# Patient Record
Sex: MALE | Race: WHITE | NOT HISPANIC OR LATINO | Employment: FULL TIME | URBAN - METROPOLITAN AREA
[De-identification: names, ages, dates, MRNs, and addresses within clinical notes are randomized per-mention and may not be internally consistent; named-entity substitution may affect disease eponyms.]

---

## 2020-11-09 ENCOUNTER — OFFICE VISIT CONVERTED (OUTPATIENT)
Dept: GASTROENTEROLOGY | Facility: CLINIC | Age: 55
End: 2020-11-09
Attending: NURSE PRACTITIONER

## 2020-11-09 ENCOUNTER — CONVERSION ENCOUNTER (OUTPATIENT)
Dept: GASTROENTEROLOGY | Facility: CLINIC | Age: 55
End: 2020-11-09

## 2021-01-15 ENCOUNTER — HOSPITAL ENCOUNTER (OUTPATIENT)
Dept: PREADMISSION TESTING | Facility: HOSPITAL | Age: 56
Discharge: HOME | End: 2021-01-15
Attending: INTERNAL MEDICINE

## 2021-01-17 LAB — SARS-COV-2 RNA SPEC QL NAA+PROBE: DETECTED

## 2021-01-20 ENCOUNTER — HOSPITAL ENCOUNTER (OUTPATIENT)
Dept: GASTROENTEROLOGY | Facility: HOSPITAL | Age: 56
Setting detail: HOSPITAL OUTPATIENT SURGERY
Discharge: HOME | End: 2021-01-20
Attending: INTERNAL MEDICINE

## 2021-03-08 ENCOUNTER — HOSPITAL ENCOUNTER (OUTPATIENT)
Dept: VACCINE CLINIC | Facility: HOSPITAL | Age: 56
Discharge: HOME | End: 2021-03-08
Attending: INTERNAL MEDICINE

## 2021-03-30 ENCOUNTER — HOSPITAL ENCOUNTER (OUTPATIENT)
Dept: VACCINE CLINIC | Facility: HOSPITAL | Age: 56
Discharge: HOME | End: 2021-03-30
Attending: INTERNAL MEDICINE

## 2021-05-10 NOTE — H&P
History and Physical      Patient Name: Mehrdad Diamond   Patient ID: [id number]   Sex: Male   YOB: 1965    Primary Care Provider: [facility]   Referring Provider: Inocente Bejarano MD    Visit Date: November 9, 2020    Provider: TEQUILA Yanez   Location: [facility]   Location Address: [address] [place] [address], [address]  [place], [place]  [id number]   Location Phone: [phone]          Chief Complaint  · GERD      History Of Present Illness  The patient is a 54 year old /White male who presents on referral from Inocente Bejarano MD for a gastroenterology evaluation.      He presents for evaluation of GERD.      States that GERD has been present for 5-10 years.  Reports that he was prescribed protonix per PCP and felt that symptoms improved. He took for 30 days and then has been taking Pepcid PRN.       Admits burning with swallowing, but denies dysphagia.      Denies family history of GI cancers.      UTD on colon cancer screening.       Past Medical History  Lumbago/low back pain; Sciatica         Past Surgical History  Hernia; Knee surgery         Allergy List  NO KNOWN DRUG ALLERGIES       Allergies Reconciled  Family Medical History  Cancer, Unspecified; Diabetes         Social History  Alcohol (Never); Tobacco (Never)         Review of Systems  · Constitutional  o Denies  o : chills, fever  · Eyes  o Denies  o : blurred vision, changes in vision  · Cardiovascular  o Denies  o : chest pain, irregular heart beats  · Respiratory  o Denies  o : shortness of breath, cough  · Gastrointestinal  o Admits  o : See HPI  · Genitourinary  o Denies  o : dysuria, blood in urine  · Integument  o Denies  o : rash, new skin lesions  · Neurologic  o Denies  o : tingling or numbness, seizures  · Musculoskeletal  o Denies  o : joint pain, joint swelling  · Endocrine  o Denies  o : weight gain, weight loss  · Psychiatric  o Denies  o : anxiety,  "depression      Vitals  Date Time BP Position Site L\R Cuff Size HR RR TEMP (F) WT  HT  BMI kg/m2 BSA m2 O2 Sat FR L/min FiO2 HC       11/09/2020 03:10 /85 Sitting      97.5 176lbs 16oz 5'  11\" 24.69 2.01             Physical Examination  · Constitutional  o Appearance  o : well developed, well-nourished, in no acute distress  · Eyes  o Vision  o :   § Visual Fields  § : eyes move symmetrical in all directions  o Sclerae  o : anicteric  o Pupils and Irises  o : pupils equal and symmetrical  · Neck  o Inspection/Palpation  o : supple  · Respiratory  o Respiratory Effort  o : breathing unlabored  o Inspection of Chest  o : normal appearance, no retractions  o Auscultation of Lungs  o : clear to auscultation bilaterally  · Cardiovascular  o Heart  o :   § Auscultation of Heart  § : no murmurs, gallops or rubs  · Gastrointestinal  o Abdominal Examination  o : soft, nontender to palpation, with normal active bowel sounds, no appreciable hepatosplenomegaly  o Digital Rectal Exam  o : deferred  · Lymphatic  o Neck  o : no palpable lymphadenopathy  · Skin and Subcutaneous Tissue  o General Inspection  o : without focal lesions; turgor is normal  · Psychiatric  o General  o : Alert and oriented x3  o Mood and Affect  o : Mood and affect are appropriate to circumstances  · Extremities  o Extremities  o : No edema, no cyanosis          Assessment  · Pre-op testing     V72.84/Z01.818  · GERD (gastroesophageal reflux disease)     530.81/K21.9      Plan  · Orders  o [facility] Pre-Op Covid-19 Screening (20544) - - 11/09/2020  o Consent for Esophagogastroduodenoscopy (EGD) - Possible risks/complications, benefits, and alternatives to surgical or invasive procedure have been explained to patient and/or legal guardian. - Patient has been evaluated and can tolerate anesthesia and/or sedation. Risks, benefits, and alternatives to anesthesia and sedation have been explained to patient and/or legal guardian. (68725) - - " 11/09/2020  · Medications  o Medications have been Reconciled  · Instructions  o Handouts provided: Pre-procedure instructions including date and time and location of procedure.  o PLAN: Proceed with procedure. Patient understands risks and benefits and is willing to proceed. Understands the risks include, but are not limited to, bleeding and/or perforation.  o Information given on current diagnoses.  o Electronically Identified Patient Education Materials Provided Electronically  · Disposition  o Follow Up after procedure  · Referrals  o ID: [id number] Date: 11/06/2020 Type: Inbound  Specialty: Gastroenterology            Electronically Signed by: Ese Lang APRN -Author on November 9, 2020 04:30:52 PM

## 2021-05-14 VITALS
SYSTOLIC BLOOD PRESSURE: 118 MMHG | WEIGHT: 177 LBS | TEMPERATURE: 97.5 F | HEIGHT: 71 IN | DIASTOLIC BLOOD PRESSURE: 85 MMHG | BODY MASS INDEX: 24.78 KG/M2

## 2021-10-28 ENCOUNTER — FLU SHOT (OUTPATIENT)
Dept: VACCINE CLINIC | Facility: HOSPITAL | Age: 56
End: 2021-10-28

## 2021-10-28 DIAGNOSIS — Z23 NEED FOR INFLUENZA VACCINATION: Primary | ICD-10-CM

## 2022-06-03 ENCOUNTER — HOSPITAL ENCOUNTER (OUTPATIENT)
Dept: CARDIOLOGY | Facility: HOSPITAL | Age: 57
Discharge: HOME | End: 2022-06-03
Admitting: NURSE PRACTITIONER

## 2022-06-03 ENCOUNTER — TRANSCRIBE ORDERS (OUTPATIENT)
Dept: ADMINISTRATIVE | Facility: HOSPITAL | Age: 57
End: 2022-06-03

## 2022-06-03 DIAGNOSIS — Z82.49 FAMILY HISTORY OF ISCHEMIC HEART DISEASE: Primary | ICD-10-CM

## 2022-06-03 DIAGNOSIS — Z82.49 FAMILY HISTORY OF ISCHEMIC HEART DISEASE: ICD-10-CM

## 2022-06-03 LAB — QT INTERVAL: 360 MS

## 2022-06-03 PROCEDURE — 93005 ELECTROCARDIOGRAM TRACING: CPT | Performed by: NURSE PRACTITIONER

## 2022-10-14 ENCOUNTER — OFFICE VISIT (OUTPATIENT)
Dept: CARDIOLOGY | Facility: CLINIC | Age: 57
End: 2022-10-14

## 2022-10-14 VITALS
DIASTOLIC BLOOD PRESSURE: 81 MMHG | SYSTOLIC BLOOD PRESSURE: 123 MMHG | BODY MASS INDEX: 26.88 KG/M2 | HEART RATE: 91 BPM | HEIGHT: 71 IN | WEIGHT: 192 LBS

## 2022-10-14 DIAGNOSIS — I25.84 CORONARY ARTERY CALCIFICATION: ICD-10-CM

## 2022-10-14 DIAGNOSIS — E78.2 MIXED HYPERLIPIDEMIA: Primary | ICD-10-CM

## 2022-10-14 DIAGNOSIS — I25.10 CORONARY ARTERY CALCIFICATION: ICD-10-CM

## 2022-10-14 PROCEDURE — 99204 OFFICE O/P NEW MOD 45 MIN: CPT | Performed by: INTERNAL MEDICINE

## 2022-10-14 RX ORDER — ATORVASTATIN CALCIUM 40 MG/1
TABLET, FILM COATED ORAL
COMMUNITY

## 2022-10-14 RX ORDER — ASPIRIN 81 MG/1
TABLET ORAL
COMMUNITY

## 2022-10-14 NOTE — PROGRESS NOTES
"Chief Complaint  Establish Care and Hyperlipidemia    Subjective        Mehrdad Diamond presents to [facility] CARDIOLOGY  History of present illness:    Patient is a 56-year-old male with past medical history significant for hyperlipidemia and family history of coronary artery disease.  The patient was started on Lipitor 10 mg nightly and had it increased to 40 mg.  He also had a coronary artery calcium score done which was 204.9.  He is active at work but does not have a regular exercise program.  When he is walking around he notes no chest pain.  He denies any palpitations or edema.  He does have a remote short smoking history for quitting 25 to 30 years ago.  He does drink a couple alcoholic beverages a week.  His mother and father have history of coronary artery bypass grafting in their 60s      Past Medical History:   Diagnosis Date   • Hyperlipidemia          History reviewed. No pertinent surgical history.       Social History     Socioeconomic History   • Marital status:    Tobacco Use   • Smoking status: Former     Types: Cigarettes   • Smokeless tobacco: Never   Vaping Use   • Vaping Use: Never used   Substance and Sexual Activity   • Alcohol use: Yes   • Drug use: Never   • Sexual activity: Defer         Family History   Problem Relation Age of Onset   • Heart attack Brother           No Known Allergies         Current Outpatient Medications:   •  aspirin 81 MG EC tablet, aspirin 81 mg tablet,delayed release  Take 1 tablet every day by oral route as directed for 30 days., Disp: , Rfl:   •  atorvastatin (LIPITOR) 40 MG tablet, atorvastatin 40 mg tablet  Take 1 tablet every day by oral route as directed for 30 days., Disp: , Rfl:       ROS:  Cardiac review of systems negative.    Objective     /81   Pulse 91   Ht 180.3 cm (71\")   Wt 87.1 kg (192 lb)   BMI 26.78 kg/m²       General Appearance:   · well developed  · well nourished  HENT:   · oropharynx moist  · lips not " cyanotic  Respiratory:  · no respiratory distress  · normal breath sounds  · no rales  Cardiovascular:  · no jugular venous distention  · regular rhythm  · S1 normal, S2 normal  · no S3, no S4   · no murmur  · no rub, no thrill  · No carotid bruit  · pedal pulses normal  · lower extremity edema: none    Musculoskeletal:  · no clubbing of fingers.   · normocephalic, head atraumatic  Skin:   · warm, dry  Psychiatric:  · judgement and insight appropriate  · normal mood and affect    ECHO:    STRESS:    CATH:  No results found for this or any previous visit.    BMP:   No results found for: GLUCOSE, BUN, CREATININE, NA, K, CL, CO2, CALCIUM, BCR, ANIONGAP, EGFR  LIPIDS:  No results found for: CHOL, TRIG, HDL, LDL, VLDL, LDLHDL      Procedures             ASSESSMENT:  Encounter Diagnoses   Name Primary?   • Mixed hyperlipidemia Yes   • Coronary artery calcification          PLAN:    1.  I am fine with him being on an aspirin due to strong family history.  2.  Continue the Lipitor.  3.  Patient did have an elevated coronary artery calcium score.  I did offer him a stress test but he is completely asymptomatic from a cardiac standpoint.  I also gave him the option of starting a regular exercise program and calling us if he has any exertional cardiac complaints.  He would like to go this route which I think is perfectly fine.  If he starts the regular exercise program he will have his modifiable risk factors under great control.  4.  Patient had a EKG done 6/3/2022 which showed normal sinus rhythm  5.  Patient's blood pressure is under excellent control.          Patient was given instructions and counseling regarding his condition or for health maintenance advice. Please see specific information pulled into the AVS if appropriate.         Milan Harrington MD   10/14/2022  15:37 EDT

## 2025-07-14 ENCOUNTER — HOSPITAL ENCOUNTER (OUTPATIENT)
Facility: HOSPITAL | Age: 60
Setting detail: OBSERVATION
Discharge: HOME | End: 2025-07-15
Attending: EMERGENCY MEDICINE | Admitting: STUDENT IN AN ORGANIZED HEALTH CARE EDUCATION/TRAINING PROGRAM
Payer: OTHER GOVERNMENT

## 2025-07-14 ENCOUNTER — APPOINTMENT (OUTPATIENT)
Dept: CT IMAGING | Facility: HOSPITAL | Age: 60
End: 2025-07-14
Payer: OTHER GOVERNMENT

## 2025-07-14 DIAGNOSIS — R10.30 LOWER ABDOMINAL PAIN: Primary | ICD-10-CM

## 2025-07-14 DIAGNOSIS — M89.9 BONE LESION: ICD-10-CM

## 2025-07-14 DIAGNOSIS — K92.2 LOWER GI BLEEDING: ICD-10-CM

## 2025-07-14 PROBLEM — K52.9 COLITIS: Status: ACTIVE | Noted: 2025-07-14

## 2025-07-14 LAB
027 TOXIN: NORMAL
ABO GROUP BLD: NORMAL
ABO GROUP BLD: NORMAL
ALBUMIN SERPL-MCNC: 4.6 G/DL (ref 3.5–5.2)
ALBUMIN/GLOB SERPL: 1.6 G/DL
ALP SERPL-CCNC: 102 U/L (ref 39–117)
ALT SERPL W P-5'-P-CCNC: 30 U/L (ref 1–41)
ANION GAP SERPL CALCULATED.3IONS-SCNC: 11.7 MMOL/L (ref 5–15)
APTT PPP: 24.8 SECONDS (ref 78–95.9)
AST SERPL-CCNC: 25 U/L (ref 1–40)
BASOPHILS # BLD AUTO: 0.05 10*3/MM3 (ref 0–0.2)
BASOPHILS NFR BLD AUTO: 0.4 % (ref 0–1.5)
BILIRUB SERPL-MCNC: 1.2 MG/DL (ref 0–1.2)
BILIRUB UR QL STRIP: NEGATIVE
BLD GP AB SCN SERPL QL: NEGATIVE
BUN SERPL-MCNC: 11.7 MG/DL (ref 6–20)
BUN/CREAT SERPL: 12.9 (ref 7–25)
C DIFF TOX GENS STL QL NAA+PROBE: NEGATIVE
CALCIUM SPEC-SCNC: 9.7 MG/DL (ref 8.6–10.5)
CHLORIDE SERPL-SCNC: 103 MMOL/L (ref 98–107)
CLARITY UR: CLEAR
CO2 SERPL-SCNC: 24.3 MMOL/L (ref 22–29)
COLOR UR: YELLOW
CREAT SERPL-MCNC: 0.91 MG/DL (ref 0.76–1.27)
D-LACTATE SERPL-SCNC: 1.2 MMOL/L (ref 0.5–2)
DEPRECATED RDW RBC AUTO: 39.8 FL (ref 37–54)
EGFRCR SERPLBLD CKD-EPI 2021: 97.7 ML/MIN/1.73
EOSINOPHIL # BLD AUTO: 0.1 10*3/MM3 (ref 0–0.4)
EOSINOPHIL NFR BLD AUTO: 0.9 % (ref 0.3–6.2)
ERYTHROCYTE [DISTWIDTH] IN BLOOD BY AUTOMATED COUNT: 11.5 % (ref 12.3–15.4)
ETHANOL BLD-MCNC: <10 MG/DL (ref 0–10)
ETHANOL UR QL: <0.01 %
GLOBULIN UR ELPH-MCNC: 2.9 GM/DL
GLUCOSE SERPL-MCNC: 110 MG/DL (ref 65–99)
GLUCOSE UR STRIP-MCNC: NEGATIVE MG/DL
HCT VFR BLD AUTO: 48.3 % (ref 37.5–51)
HEMOCCULT STL QL IA: POSITIVE
HGB BLD-MCNC: 17 G/DL (ref 13–17.7)
HGB UR QL STRIP.AUTO: NEGATIVE
HOLD SPECIMEN: NORMAL
HOLD SPECIMEN: NORMAL
IMM GRANULOCYTES # BLD AUTO: 0.04 10*3/MM3 (ref 0–0.05)
IMM GRANULOCYTES NFR BLD AUTO: 0.3 % (ref 0–0.5)
INR PPP: 0.99 (ref 0.86–1.15)
KETONES UR QL STRIP: NEGATIVE
LEUKOCYTE ESTERASE UR QL STRIP.AUTO: NEGATIVE
LIPASE SERPL-CCNC: 49 U/L (ref 13–60)
LYMPHOCYTES # BLD AUTO: 1.7 10*3/MM3 (ref 0.7–3.1)
LYMPHOCYTES NFR BLD AUTO: 14.8 % (ref 19.6–45.3)
MCH RBC QN AUTO: 33.5 PG (ref 26.6–33)
MCHC RBC AUTO-ENTMCNC: 35.2 G/DL (ref 31.5–35.7)
MCV RBC AUTO: 95.1 FL (ref 79–97)
MONOCYTES # BLD AUTO: 1.05 10*3/MM3 (ref 0.1–0.9)
MONOCYTES NFR BLD AUTO: 9.1 % (ref 5–12)
NEUTROPHILS NFR BLD AUTO: 74.5 % (ref 42.7–76)
NEUTROPHILS NFR BLD AUTO: 8.56 10*3/MM3 (ref 1.7–7)
NITRITE UR QL STRIP: NEGATIVE
NRBC BLD AUTO-RTO: 0 /100 WBC (ref 0–0.2)
PH UR STRIP.AUTO: 7.5 [PH] (ref 5–8)
PLATELET # BLD AUTO: 234 10*3/MM3 (ref 140–450)
PMV BLD AUTO: 9.7 FL (ref 6–12)
POTASSIUM SERPL-SCNC: 4.1 MMOL/L (ref 3.5–5.2)
PROT SERPL-MCNC: 7.5 G/DL (ref 6–8.5)
PROT UR QL STRIP: NEGATIVE
PROTHROMBIN TIME: 13.5 SECONDS (ref 11.8–14.9)
RBC # BLD AUTO: 5.08 10*6/MM3 (ref 4.14–5.8)
RH BLD: POSITIVE
RH BLD: POSITIVE
SODIUM SERPL-SCNC: 139 MMOL/L (ref 136–145)
SP GR UR STRIP: >1.03 (ref 1–1.03)
T&S EXPIRATION DATE: NORMAL
UROBILINOGEN UR QL STRIP: ABNORMAL
WBC NRBC COR # BLD AUTO: 11.5 10*3/MM3 (ref 3.4–10.8)
WHOLE BLOOD HOLD COAG: NORMAL
WHOLE BLOOD HOLD SPECIMEN: NORMAL

## 2025-07-14 PROCEDURE — 99252 IP/OBS CONSLTJ NEW/EST SF 35: CPT

## 2025-07-14 PROCEDURE — 86901 BLOOD TYPING SEROLOGIC RH(D): CPT | Performed by: EMERGENCY MEDICINE

## 2025-07-14 PROCEDURE — 80053 COMPREHEN METABOLIC PANEL: CPT | Performed by: EMERGENCY MEDICINE

## 2025-07-14 PROCEDURE — 82274 ASSAY TEST FOR BLOOD FECAL: CPT | Performed by: EMERGENCY MEDICINE

## 2025-07-14 PROCEDURE — 87493 C DIFF AMPLIFIED PROBE: CPT | Performed by: NURSE PRACTITIONER

## 2025-07-14 PROCEDURE — 74177 CT ABD & PELVIS W/CONTRAST: CPT

## 2025-07-14 PROCEDURE — 86900 BLOOD TYPING SEROLOGIC ABO: CPT

## 2025-07-14 PROCEDURE — 99285 EMERGENCY DEPT VISIT HI MDM: CPT

## 2025-07-14 PROCEDURE — 86900 BLOOD TYPING SEROLOGIC ABO: CPT | Performed by: EMERGENCY MEDICINE

## 2025-07-14 PROCEDURE — 83605 ASSAY OF LACTIC ACID: CPT | Performed by: EMERGENCY MEDICINE

## 2025-07-14 PROCEDURE — 96375 TX/PRO/DX INJ NEW DRUG ADDON: CPT

## 2025-07-14 PROCEDURE — 86850 RBC ANTIBODY SCREEN: CPT | Performed by: EMERGENCY MEDICINE

## 2025-07-14 PROCEDURE — 25010000002 MORPHINE PER 10 MG: Performed by: NURSE PRACTITIONER

## 2025-07-14 PROCEDURE — 25010000002 ONDANSETRON PER 1 MG: Performed by: NURSE PRACTITIONER

## 2025-07-14 PROCEDURE — 36415 COLL VENOUS BLD VENIPUNCTURE: CPT

## 2025-07-14 PROCEDURE — 25510000001 IOPAMIDOL PER 1 ML: Performed by: EMERGENCY MEDICINE

## 2025-07-14 PROCEDURE — G0378 HOSPITAL OBSERVATION PER HR: HCPCS

## 2025-07-14 PROCEDURE — 86901 BLOOD TYPING SEROLOGIC RH(D): CPT

## 2025-07-14 PROCEDURE — 87505 NFCT AGENT DETECTION GI: CPT | Performed by: NURSE PRACTITIONER

## 2025-07-14 PROCEDURE — 99222 1ST HOSP IP/OBS MODERATE 55: CPT | Performed by: STUDENT IN AN ORGANIZED HEALTH CARE EDUCATION/TRAINING PROGRAM

## 2025-07-14 PROCEDURE — 81003 URINALYSIS AUTO W/O SCOPE: CPT | Performed by: NURSE PRACTITIONER

## 2025-07-14 PROCEDURE — 85730 THROMBOPLASTIN TIME PARTIAL: CPT | Performed by: NURSE PRACTITIONER

## 2025-07-14 PROCEDURE — 82077 ASSAY SPEC XCP UR&BREATH IA: CPT | Performed by: NURSE PRACTITIONER

## 2025-07-14 PROCEDURE — 85025 COMPLETE CBC W/AUTO DIFF WBC: CPT | Performed by: EMERGENCY MEDICINE

## 2025-07-14 PROCEDURE — 85610 PROTHROMBIN TIME: CPT | Performed by: NURSE PRACTITIONER

## 2025-07-14 PROCEDURE — 83690 ASSAY OF LIPASE: CPT | Performed by: NURSE PRACTITIONER

## 2025-07-14 RX ORDER — BISACODYL 10 MG
10 SUPPOSITORY, RECTAL RECTAL DAILY PRN
Status: DISCONTINUED | OUTPATIENT
Start: 2025-07-14 | End: 2025-07-15 | Stop reason: HOSPADM

## 2025-07-14 RX ORDER — ATORVASTATIN CALCIUM 40 MG/1
40 TABLET, FILM COATED ORAL NIGHTLY
Status: DISCONTINUED | OUTPATIENT
Start: 2025-07-14 | End: 2025-07-15 | Stop reason: HOSPADM

## 2025-07-14 RX ORDER — SODIUM CHLORIDE 9 MG/ML
40 INJECTION, SOLUTION INTRAVENOUS AS NEEDED
Status: DISCONTINUED | OUTPATIENT
Start: 2025-07-14 | End: 2025-07-15 | Stop reason: HOSPADM

## 2025-07-14 RX ORDER — ACETAMINOPHEN 325 MG/1
650 TABLET ORAL EVERY 6 HOURS PRN
Status: DISCONTINUED | OUTPATIENT
Start: 2025-07-14 | End: 2025-07-15

## 2025-07-14 RX ORDER — SODIUM CHLORIDE 0.9 % (FLUSH) 0.9 %
10 SYRINGE (ML) INJECTION AS NEEDED
Status: DISCONTINUED | OUTPATIENT
Start: 2025-07-14 | End: 2025-07-15 | Stop reason: HOSPADM

## 2025-07-14 RX ORDER — SODIUM CHLORIDE 0.9 % (FLUSH) 0.9 %
10 SYRINGE (ML) INJECTION EVERY 12 HOURS SCHEDULED
Status: DISCONTINUED | OUTPATIENT
Start: 2025-07-14 | End: 2025-07-15 | Stop reason: HOSPADM

## 2025-07-14 RX ORDER — AMOXICILLIN 250 MG
2 CAPSULE ORAL 2 TIMES DAILY PRN
Status: DISCONTINUED | OUTPATIENT
Start: 2025-07-14 | End: 2025-07-15 | Stop reason: HOSPADM

## 2025-07-14 RX ORDER — ASPIRIN 81 MG/1
81 TABLET ORAL DAILY
Status: DISCONTINUED | OUTPATIENT
Start: 2025-07-14 | End: 2025-07-15 | Stop reason: HOSPADM

## 2025-07-14 RX ORDER — POLYETHYLENE GLYCOL 3350 17 G/17G
17 POWDER, FOR SOLUTION ORAL DAILY PRN
Status: DISCONTINUED | OUTPATIENT
Start: 2025-07-14 | End: 2025-07-15 | Stop reason: HOSPADM

## 2025-07-14 RX ORDER — IOPAMIDOL 755 MG/ML
100 INJECTION, SOLUTION INTRAVASCULAR
Status: COMPLETED | OUTPATIENT
Start: 2025-07-14 | End: 2025-07-14

## 2025-07-14 RX ORDER — BISACODYL 5 MG/1
5 TABLET, DELAYED RELEASE ORAL DAILY PRN
Status: DISCONTINUED | OUTPATIENT
Start: 2025-07-14 | End: 2025-07-15 | Stop reason: HOSPADM

## 2025-07-14 RX ORDER — ONDANSETRON 2 MG/ML
4 INJECTION INTRAMUSCULAR; INTRAVENOUS ONCE
Status: COMPLETED | OUTPATIENT
Start: 2025-07-14 | End: 2025-07-14

## 2025-07-14 RX ADMIN — IOPAMIDOL 100 ML: 755 INJECTION, SOLUTION INTRAVENOUS at 09:27

## 2025-07-14 RX ADMIN — Medication 10 ML: at 15:05

## 2025-07-14 RX ADMIN — ACETAMINOPHEN 650 MG: 325 TABLET ORAL at 15:00

## 2025-07-14 RX ADMIN — MORPHINE SULFATE 4 MG: 4 INJECTION, SOLUTION INTRAMUSCULAR; INTRAVENOUS at 10:28

## 2025-07-14 RX ADMIN — ATORVASTATIN CALCIUM 40 MG: 40 TABLET, FILM COATED ORAL at 20:05

## 2025-07-14 RX ADMIN — Medication 10 ML: at 20:06

## 2025-07-14 RX ADMIN — ONDANSETRON 4 MG: 2 INJECTION, SOLUTION INTRAMUSCULAR; INTRAVENOUS at 10:28

## 2025-07-14 NOTE — PLAN OF CARE
Goal Outcome Evaluation:  Plan of Care Reviewed With: patient        Progress: no change  Outcome Evaluation: Patient a new admission this shift, vitals stable, refused a full skin assessment, having bright red bloody stools.

## 2025-07-14 NOTE — H&P
[facility] HISTORY AND PHYSICAL  Date: 2025   Patient Name: Mehrdad Diamond  : 1965  MRN: [id number]  Primary Care Physician:  Ashtyn Coyle APRN  Date of admission: 2025    Subjective   Subjective     Chief Complaint: Bright red blood per rectum    HPI:    Mehrdad Diamond is a 59 y.o. male with history of mixed hyperlipidemia and family history of CAD who presented with bright red blood per rectum.  Patient stated symptoms started Saturday night.  He takes aspirin at home.  He noticed some blood initially started in the night but after that started having leland blood.  No history of hemorrhoids and painless BRBPR.  He also started having abdominal pain along with leland blood, denied any diarrhea.  Felt like he was having a bowel movement but stated no stool came out, it was only leland blood.  It was happening often and also had 1 episode of vomiting yesterday along with abdominal pain after which she got concerned and presented to ER for further evaluation management.  Takes aspirin daily but did not take his last night dose, does take ibuprofen occasionally for headache.  He had last colonoscopy done in 2017 which showed 1 benign polyp.  On presentation to ER, vitals were stable.  Lab work showed WBC of 11.50 and hemoglobin of 17.0.  FOBT was positive.  PT 13.5 and INR 0.99.  Lactic acid of 1.2 and lipase was normal.  CT abdomen pelvis with contrast showed long segment of concentric wall thickening with pericolonic fat stranding along the descending colon compatible with colitis and a large prostate.  Also showed nonspecific subcortical area of sclerosis of right femoral neck which could represent sclerotic bone lesion.  GI was consulted by ER physician and patient was admitted under observation for further evaluation management.      Personal History     Past Medical History:  Past Medical History:   Diagnosis Date    Cancer     Basal Cell removed from nose     Hyperlipidemia        Past Surgical History:  Past Surgical History:   Procedure Laterality Date    COLONOSCOPY  02/01/2017    HERNIA REPAIR         Family History:       Social History:   Social History     Socioeconomic History    Marital status:    Tobacco Use    Smoking status: Former     Average packs/day: 0.5 packs/day for 10.0 years (5.0 ttl pk-yrs)     Types: Cigarettes     Start date: 1990     Passive exposure: Past    Smokeless tobacco: Never   Vaping Use    Vaping status: Never Used   Substance and Sexual Activity    Alcohol use: Yes     Alcohol/week: 14.0 standard drinks of alcohol     Types: 14 Drinks containing 0.5 oz of alcohol per week     Comment: Couple glasses of bourbon every day    Drug use: Never    Sexual activity: Yes     Partners: Female       Home Medications:  Evolocumab, aspirin, and atorvastatin    Allergies:  No Known Allergies    Review of Systems   All systems were reviewed and negative except for: Bright red blood per rectum, abdominal pain    Objective   Objective     Vitals:   Temp:  [97.3 °F (36.3 °C)-97.8 °F (36.6 °C)] 97.3 °F (36.3 °C)  Heart Rate:  [69-81] 73  Resp:  [16] 16  BP: (116-146)/(62-97) 135/62    Physical Exam    Constitutional: Awake, alert, no acute distress   Respiratory: Clear to auscultation bilaterally, nonlabored respirations    Cardiovascular: RRR, no murmurs, rubs, or gallops, palpable pedal pulses bilaterally   Gastrointestinal: Positive bowel sounds, soft, nontender, non distended   Neurologic: Alert, awake, oriented x 3,  speech clear       Result Review    Result Review:  I have personally reviewed the results from the time of this admission to 7/14/2025 18:01 EDT and agree with these findings:  []  Laboratory  []  Microbiology  []  Radiology  []  EKG/Telemetry   []  Cardiology/Vascular   []  Pathology  []  Old records  []  Other:      Assessment & Plan   Assessment / Plan     Assessment/Plan:   Bright red blood per rectum  Abdominal  pain  Vomiting  Hyperlipidemia  Family history of CAD    -Patient is being admitted to the hospital service.  - Presented with bright red blood per rectum along with abdominal pain and 1 episode of vomiting.  - Last colonoscopy done in 2017 showed 1 benign polyp.  - Hemoglobin 17.0 on presentation.  - FOBT positive.  - GI consulted, recommended obtaining C. difficile panel and stool cultures.  - Planning on outpatient colonoscopy in 6 weeks if stable inpatient.  - Appreciate further input by GI.  - Awaiting enteric bacterial panel along with C. difficile.  - Will hold home aspirin.  - Continue atorvastatin.  -PSA level ordered.  - Labs in AM.  - Continue rest of current management.    VTE Prophylaxis:  No VTE prophylaxis order currently exists.        CODE STATUS:    Code Status (Patient has no pulse and is not breathing): CPR (Attempt to Resuscitate)  Medical Interventions (Patient has pulse or is breathing): Full Support      Admission Status:  I believe this patient meets observation status.    Electronically signed by Meredith Lomeli MD, 07/14/25, 12:04 PM EDT.

## 2025-07-14 NOTE — ED PROVIDER NOTES
"SHARED VISIT ATTESTATION:    This visit was performed by myself and an APC.  I personally approved the management plan/medical decision making and take responsibility for the patient management.      SHARED VISIT NOTE:    Patient is 59 y.o. year old male that presents to the ED for evaluation of abdominal pain and rectal bleeding.  Patient does drink daily and is on aspirin.  Bright red blood per rectum..     Physical Exam    ED Course:    /90 (BP Location: Left arm, Patient Position: Lying)   Pulse 69   Temp 97.7 °F (36.5 °C) (Oral)   Resp 16   Ht 180.3 cm (71\")   Wt 88.7 kg (195 lb 8.8 oz)   SpO2 93%   BMI 27.27 kg/m²       The following orders were placed and all results were independently analyzed by me:  Orders Placed This Encounter   Procedures    Enteric Bacterial Panel - Stool, Per Rectum    Clostridioides difficile Toxin - Stool, Per Rectum    Clostridioides difficile Toxin, PCR - Stool, Per Rectum    CT Abdomen Pelvis With Contrast    [place] Draw    Comprehensive Metabolic Panel    Lactic Acid, Plasma    Occult Blood, Fecal By Immunoassay - Stool, Per Rectum    CBC Auto Differential    Ethanol    Lipase    Urinalysis With Microscopic If Indicated (No Culture) - Urine, Clean Catch    Protime-INR    aPTT    PSA Total, Reflex To Free    Basic Metabolic Panel    Magnesium    Phosphorus    Calcium    Diet: Cardiac, Gastrointestinal; Healthy Heart (2-3 Na+); Low Irritant; Fluid Consistency: Thin (IDDSI 0)    Undress & Gown    Measure Blood Pressure    Vital Signs    Orthostatic Blood Pressure    Vital Signs    Intake & Output    Weigh Patient    Oral Care    Saline Lock & Maintain IV Access    Discontinue Cardiac Monitoring    Inpatient Hospitalist Consult    IP Consult to Gastroenterology    Patient Isolation Contact Spore    Pulse Oximetry    Oxygen Therapy- Nasal Cannula; Titrate 1-6 LPM Per SpO2; 90 - 95%    Type & Screen    ABO RH Specimen Verification    Insert Peripheral IV    Insert " Peripheral IV    Initiate Observation Status    CBC & Differential    Green Top (Gel)    Lavender Top    Gold Top - SST    Light Blue Top    CBC & Differential       Medications Given in the Emergency Department:  Medications   sodium chloride 0.9 % flush 10 mL (has no administration in time range)   sodium chloride 0.9 % flush 10 mL (has no administration in time range)   sodium chloride 0.9 % flush 10 mL (has no administration in time range)   sodium chloride 0.9 % infusion 40 mL (has no administration in time range)   sennosides-docusate (PERICOLACE) 8.6-50 MG per tablet 2 tablet (has no administration in time range)     And   polyethylene glycol (MIRALAX) packet 17 g (has no administration in time range)     And   bisacodyl (DULCOLAX) EC tablet 5 mg (has no administration in time range)     And   bisacodyl (DULCOLAX) suppository 10 mg (has no administration in time range)   aspirin EC tablet 81 mg ( Oral Dose Auto Held 7/22/25 0900)   atorvastatin (LIPITOR) tablet 40 mg (has no administration in time range)   acetaminophen (TYLENOL) tablet 650 mg (has no administration in time range)   morphine injection 4 mg (4 mg Intravenous Given 7/14/25 1028)   ondansetron (ZOFRAN) injection 4 mg (4 mg Intravenous Given 7/14/25 1028)   iopamidol (ISOVUE-370) 76 % injection 100 mL (100 mL Intravenous Given 7/14/25 0927)        ED Course:         Labs:    Lab Results (last 24 hours)       Procedure Component Value Units Date/Time    CBC & Differential [[id number]]  (Abnormal) Collected: 07/14/25 0855    Specimen: Blood Updated: 07/14/25 0910    Narrative:      The following orders were created for panel order CBC & Differential.  Procedure                               Abnormality         Status                     ---------                               -----------         ------                     CBC Auto Differential[[id number]]        Abnormal            Final result                 Please view results for these tests on  the individual orders.    Comprehensive Metabolic Panel [[id number]]  (Abnormal) Collected: 07/14/25 0855    Specimen: Blood Updated: 07/14/25 0925     Glucose 110 mg/dL      BUN 11.7 mg/dL      Creatinine 0.91 mg/dL      Sodium 139 mmol/L      Potassium 4.1 mmol/L      Chloride 103 mmol/L      CO2 24.3 mmol/L      Calcium 9.7 mg/dL      Total Protein 7.5 g/dL      Albumin 4.6 g/dL      ALT (SGPT) 30 U/L      AST (SGOT) 25 U/L      Alkaline Phosphatase 102 U/L      Total Bilirubin 1.2 mg/dL      Globulin 2.9 gm/dL      A/G Ratio 1.6 g/dL      BUN/Creatinine Ratio 12.9     Anion Gap 11.7 mmol/L      eGFR 97.7 mL/min/1.73     Narrative:      GFR Categories in Chronic Kidney Disease (CKD)              GFR Category          GFR (mL/min/1.73)    Interpretation  G1                    90 or greater        Normal or high (1)  G2                    60-89                Mild decrease (1)  G3a                   45-59                Mild to moderate decrease  G3b                   30-44                Moderate to severe decrease  G4                    15-29                Severe decrease  G5                    14 or less           Kidney failure    (1)In the absence of evidence of kidney disease, neither GFR category G1 or G2 fulfill the criteria for CKD.    eGFR calculation 2021 CKD-EPI creatinine equation, which does not include race as a factor    Lactic Acid, Plasma [[id number]]  (Normal) Collected: 07/14/25 0855    Specimen: Blood Updated: 07/14/25 0921     Lactate 1.2 mmol/L     CBC Auto Differential [[id number]]  (Abnormal) Collected: 07/14/25 0855    Specimen: Blood Updated: 07/14/25 0910     WBC 11.50 10*3/mm3      RBC 5.08 10*6/mm3      Hemoglobin 17.0 g/dL      Hematocrit 48.3 %      MCV 95.1 fL      MCH 33.5 pg      MCHC 35.2 g/dL      RDW 11.5 %      RDW-SD 39.8 fl      MPV 9.7 fL      Platelets 234 10*3/mm3      Neutrophil % 74.5 %      Lymphocyte % 14.8 %      Monocyte % 9.1 %      Eosinophil % 0.9 %      Basophil %  0.4 %      Immature Grans % 0.3 %      Neutrophils, Absolute 8.56 10*3/mm3      Lymphocytes, Absolute 1.70 10*3/mm3      Monocytes, Absolute 1.05 10*3/mm3      Eosinophils, Absolute 0.10 10*3/mm3      Basophils, Absolute 0.05 10*3/mm3      Immature Grans, Absolute 0.04 10*3/mm3      nRBC 0.0 /100 WBC     Ethanol [[id number]] Collected: 07/14/25 0855    Specimen: Blood Updated: 07/14/25 0933     Ethanol <10 mg/dL      Ethanol % <0.010 %     Narrative:      Not for legal purposes.    Lipase [[id number]]  (Normal) Collected: 07/14/25 0855    Specimen: Blood Updated: 07/14/25 0933     Lipase 49 U/L     Protime-INR [[id number]]  (Normal) Collected: 07/14/25 0855    Specimen: Blood Updated: 07/14/25 0921     Protime 13.5 Seconds      INR 0.99    Narrative:      Suggested Therapeutic Ranges For Oral Anticoagulant Therapy:  Level of Therapy                      INR Target Range  Standard Dose                            2.0-3.0  High Dose                                2.5-3.5  Patients not receiving anticoagulant  Therapy Normal Range                     0.86-1.15    aPTT [[id number]]  (Abnormal) Collected: 07/14/25 0855    Specimen: Blood Updated: 07/14/25 0921     PTT 24.8 seconds     Occult Blood, Fecal By Immunoassay - Stool, Per Rectum [[id number]]  (Abnormal) Collected: 07/14/25 1002    Specimen: Stool from Per Rectum Updated: 07/14/25 1043     Occult Blood, Fecal by Immunoassay Positive    Urinalysis With Microscopic If Indicated (No Culture) - Urine, Clean Catch [[id number]]  (Abnormal) Collected: 07/14/25 1003    Specimen: Urine, Clean Catch Updated: 07/14/25 1018     Color, UA Yellow     Appearance, UA Clear     pH, UA 7.5     Specific Gravity, UA >1.030     Glucose, UA Negative     Ketones, UA Negative     Bilirubin, UA Negative     Blood, UA Negative     Protein, UA Negative     Leuk Esterase, UA Negative     Nitrite, UA Negative     Urobilinogen, UA 0.2 E.U./dL    Narrative:      Urine microscopic not indicated.              Imaging:    CT Abdomen Pelvis With Contrast  Result Date: 7/14/2025  CT ABDOMEN PELVIS W CONTRAST Date of Exam: 7/14/2025 9:17 AM EDT Indication: diffuse abdominal pain, rectal bleeding. Comparison: None available. Technique: Axial CT images were obtained of the abdomen and pelvis after the uneventful intravenous administration of iodinated contrast. Reconstructed coronal and sagittal images were also obtained. Automated exposure control and iterative construction methods were used. Findings: Lung Bases:  Mild bibasilar discoid atelectasis or scarring Liver: Mild diffuse decreased liver density compatible with fatty replacement. No focal liver lesions are seen Biliary/Gallbladder: The gallbladder is without evidence of radiopaque stones. The biliary tree is nondilated. Spleen:Spleen is normal in size and CT density. Pancreas: Pancreas shows homogeneous density. There is no evidence of pancreatic mass or peripancreatic fluid. Kidneys: Kidneys are normal in size. There are no stones or hydronephrosis. Adrenals: Adrenal glands are unremarkable. Retroperitoneal/Lymph Nodes/Vasculature: No retroperitoneal adenopathy is identified by size criteria. Gastrointestinal/Mesentery: The small bowel loops are normal in caliber. Terminal ileum within normal limits. Appendix is normal. The large bowel is not dilated. There is a long segment of concentric wall thickening with pericolonic fat stranding along the descending colon compatible with a colitis. There is no free fluid or free air Bladder: The bladder is unremarkable Diffusely enlarged prostate results in mass effect over the bladder. Omental fat inguinal hernias Bony Structures: Multilevel disc bulges with lumbar spondylosis. Extensive endplate sclerosis at L4-5. No acute fractures. Focal nonspecific subcortical area of sclerosis at the right femoral neck could represent a sclerotic bone lesion (image 199 series  3, 103 series 4     Impression: 1.Long segment of  concentric wall thickening with pericolonic fat stranding along the descending colon compatible with a colitis. Follow-up to resolution recommended 2.Enlarged prostate. Clinical and PSA correlation recommended 3.Nonspecific subcortical area of sclerosis at the right femoral neck could represent a sclerotic bone lesion. Correlate clinically and may consider further evaluation with a nonemergent MRI of the hip Electronically Signed: Sterling Brian MD  7/14/2025 9:41 AM EDT  Workstation ID: [id number]      MDM:    Patient with bright red blood per rectum.  Appears to have acute colitis.  Will admit to the hospital further workup and management.    Procedures    Labs were collected in the emergency department and all labs were reviewed and interpreted by me.  CT scan was performed in the emergency department and the CT scan radiology impression was interpreted by me.                     Jesus Meza MD  14:56 EDT  07/14/25         Jesus Meza MD  07/14/25 [id number]

## 2025-07-14 NOTE — CONSULTS
[facility]  Initial Inpatient Consult Note    Referring Provider: ED    Reason for Consultation: Colitis    Subjective     History of present illness:    59 y.o. male with a past medical history of hyperlipidemia presented to the ED for evaluation of abdominal pain and rectal bleeding.  Patient states yesterday morning he started having abdominal cramping along the lower abdomen along with diarrhea.  Patient states it was about 1 ounce of blood with each bowel movement.  Patient also states that he was having diarrhea every 15-20 min, but that has slowed down and last night it was every 1-2 hours.  Sometimes when he goes it is straight bright red blood.  He had one episode of vomiting yesterday, but hasn't occurred again.  He does take Ibuprofen occasionally for headache, but not daily.  Patient denies nausea, hematemesis, melena, and fevers.    Patient's last colonoscopy was performed in 2017-report not found-patient reports 1 benign polyp   Patient had colo guard recently which was normal per patient     07/14/2025 CT Abdomen/Pelvis  1.Long segment of concentric wall thickening with pericolonic fat stranding along the descending colon compatible with a colitis. Follow-up to resolution recommended  2.Enlarged prostate. Clinical and PSA correlation recommended  3.Nonspecific subcortical area of sclerosis at the right femoral neck could represent a sclerotic bone lesion. Correlate clinically and may consider further evaluation with a nonemergent MRI of the hip     Past Medical History:  Past Medical History:   Diagnosis Date    Hyperlipidemia      Past Surgical History:  Past Surgical History:   Procedure Laterality Date    COLONOSCOPY  02/01/2017      Social History:   Social History     Tobacco Use    Smoking status: Former     Types: Cigarettes    Smokeless tobacco: Never   Substance Use Topics    Alcohol use: Yes      Family History:  Family History   Problem Relation Age of Onset     Heart attack Brother        Home Meds:  (Not in a hospital admission)    Current Meds:      Allergies:  No Known Allergies  Review of Systems  Pertinent items are noted in HPI     Objective     Vital Signs  Temp:  [97.8 °F (36.6 °C)] 97.8 °F (36.6 °C)  Heart Rate:  [73-81] 79  Resp:  [16] 16  BP: (140-146)/(96-97) 146/96  Physical Exam:  General Appearance:    Alert, cooperative, in no acute distress   Head:    Normocephalic, without obvious abnormality, atraumatic   Eyes:          conjunctivae and sclerae normal, no icterus   Throat:   no thrush, oral mucosa moist   Neck:   Supple, no adenopathy   Lungs:     Unlabored breathing     Heart:    Regular rhythm and normal rate    Chest Wall:    No abnormalities observed   Abdomen:     Soft, non distended, tender in LQ's   Extremities:   no edema, no redness   Skin:   No bruising or rash   Psychiatric:  normal mood and insight     Results Review:   I reviewed the patient's new clinical results.    Results from last 7 days   Lab Units 07/14/25  0855   WBC 10*3/mm3 11.50*   HEMOGLOBIN g/dL 17.0   HEMATOCRIT % 48.3   PLATELETS 10*3/mm3 234     Results from last 7 days   Lab Units 07/14/25  0855   SODIUM mmol/L 139   POTASSIUM mmol/L 4.1   CHLORIDE mmol/L 103   CO2 mmol/L 24.3   BUN mg/dL 11.7   CREATININE mg/dL 0.91   CALCIUM mg/dL 9.7   BILIRUBIN mg/dL 1.2   ALK PHOS U/L 102   ALT (SGPT) U/L 30   AST (SGOT) U/L 25   GLUCOSE mg/dL 110*     Results from last 7 days   Lab Units 07/14/25  0855   INR  0.99     Lab Results   Lab Value Date/Time    LIPASE 49 07/14/2025 0855       Radiology:  CT Abdomen Pelvis With Contrast  Result Date: 7/14/2025  Impression: 1.Long segment of concentric wall thickening with pericolonic fat stranding along the descending colon compatible with a colitis. Follow-up to resolution recommended 2.Enlarged prostate. Clinical and PSA correlation recommended 3.Nonspecific subcortical area of sclerosis at the right femoral neck could represent a sclerotic  bone lesion. Correlate clinically and may consider further evaluation with a nonemergent MRI of the hip Electronically Signed: Sterling Brian MD  7/14/2025 9:41 AM EDT  Workstation ID: [id number]       Assessment & Plan     * No active hospital problems. *       Assessment:  Colitis  Abdominal pain  Bright red blood per rectum    Plan:  Discussed findings of Colitis with patient and wife-will hold on antibiotics for now-Dr. Maradiaga ordered C-diff panel and stool cultures-will await results  Patient will need outpatient colonoscopy in 6 weeks   Patient understands and agrees to the plan      I discussed the patients findings and my recommendations with patient and family.    Electronically signed by TEQUILA Payan, 07/14/25, 10:44 AM EDT.      Patient seen and data reviewed.  Patient stool C. difficile is negative.  Cultures are still pending.  Patient symptomatically has improved significantly had a semiformed brown stool today.  He has no abdominal pain.  But he also needs him is sick.  Patient's last colonoscopy was about 8 to 10 years ago patient did have a polyp.  Discussed the importance of colonoscopy and patient will schedule that as an outpatient.

## 2025-07-14 NOTE — ED PROVIDER NOTES
rodger: 8:50 AM EDT  Date of encounter:  7/14/2025  Independent Historian/Clinical History and Information was obtained by:   Patient          Chief Complaint:  abdominal pain        History is limited by: N/A          History of Present Illness:  Patient is a 59 y.o. year old male with a history of high cholesterol who presents to the emergency department for evaluation of abdominal pain that started at 2 AM yesterday morning with bright red blood from rectum.  Patient is having diarrhea.  Patient states he had a colonoscopy 8 years ago with 1 polyp removed.  Patient denies fever, cough, chest pain shortness of breath, dysuria, hematuria, abdominal swelling, testicular pain or swelling, or other complaint.  Patient denies smoking.  Patient states he drinks 2-3 drinks of bourbon daily.  Patient is on a baby aspirin.              Patient Care Team  Primary Care Provider: Ashtyn Coyle APRN    Past Medical History:     No Known Allergies  Past Medical History:   Diagnosis Date    Hyperlipidemia      Past Surgical History:   Procedure Laterality Date    COLONOSCOPY  02/01/2017     Family History   Problem Relation Age of Onset    Heart attack Brother        Home Medications:  Prior to Admission medications    Medication Sig Start Date End Date Taking? Authorizing Provider   aspirin 81 MG EC tablet aspirin 81 mg tablet,delayed release   Take 1 tablet every day by oral route as directed for 30 days.    ProviderMarc MD   atorvastatin (LIPITOR) 40 MG tablet atorvastatin 40 mg tablet   Take 1 tablet every day by oral route as directed for 30 days.    ProviderMarc MD        Social History:   Social History     Tobacco Use    Smoking status: Former     Types: Cigarettes    Smokeless tobacco: Never   Vaping Use    Vaping status: Never Used   Substance Use Topics    Alcohol use: Yes    Drug use: Never         Physical Exam:  /91   Pulse 75   Temp 97.8 °F (36.6 °C) (Oral)   Resp 16   Ht  "180.3 cm (71\")   Wt 86.4 kg (190 lb 7.6 oz)   SpO2 94%   BMI 26.57 kg/m²     Physical Exam  Constitutional:       Appearance: Normal appearance.   HENT:      Head: Normocephalic and atraumatic.   Cardiovascular:      Rate and Rhythm: Normal rate and regular rhythm.      Pulses: Normal pulses.      Heart sounds: Normal heart sounds.   Pulmonary:      Effort: Pulmonary effort is normal.      Breath sounds: Normal breath sounds.   Abdominal:      Palpations: Abdomen is soft.      Tenderness: There is abdominal tenderness.   Musculoskeletal:         General: Normal range of motion.   Skin:     General: Skin is warm and dry.   Neurological:      General: No focal deficit present.      Mental Status: He is alert and oriented to person, place, and time.   Psychiatric:         Mood and Affect: Mood normal.         Behavior: Behavior normal.         Thought Content: Thought content normal.         Judgment: Judgment normal.                        Comorbidities that affect care:    None    External Notes reviewed:    None      The following orders were placed and all results were independently analyzed by me:  Orders Placed This Encounter   Procedures    Enteric Bacterial Panel - Stool, Per Rectum    Clostridioides difficile Toxin - Stool, Per Rectum    Clostridioides difficile Toxin, PCR - Stool, Per Rectum    CT Abdomen Pelvis With Contrast    [place] Draw    Comprehensive Metabolic Panel    Lactic Acid, Plasma    Occult Blood, Fecal By Immunoassay - Stool, Per Rectum    CBC Auto Differential    Ethanol    Lipase    Urinalysis With Microscopic If Indicated (No Culture) - Urine, Clean Catch    Protime-INR    aPTT    NPO Diet NPO Type: Strict NPO    Undress & Gown    Measure Blood Pressure    Vital Signs    Orthostatic Blood Pressure    Inpatient Hospitalist Consult    IP Consult to Gastroenterology    Patient Isolation Contact Spore    Pulse Oximetry    Oxygen Therapy- Nasal Cannula; Titrate 1-6 LPM Per SpO2; 90 - 95%    " Type & Screen    ABO RH Specimen Verification    Insert Peripheral IV    CBC & Differential    Green Top (Gel)    Lavender Top    Gold Top - SST    Light Blue Top       Medications Given in the Emergency Department:  Medications   sodium chloride 0.9 % flush 10 mL (has no administration in time range)   morphine injection 4 mg (4 mg Intravenous Given 7/14/25 1028)   ondansetron (ZOFRAN) injection 4 mg (4 mg Intravenous Given 7/14/25 1028)   iopamidol (ISOVUE-370) 76 % injection 100 mL (100 mL Intravenous Given 7/14/25 0927)                Labs:    Lab Results (last 24 hours)       Procedure Component Value Units Date/Time    CBC & Differential [[id number]]  (Abnormal) Collected: 07/14/25 0855    Specimen: Blood Updated: 07/14/25 0910    Narrative:      The following orders were created for panel order CBC & Differential.  Procedure                               Abnormality         Status                     ---------                               -----------         ------                     CBC Auto Differential[[id number]]        Abnormal            Final result                 Please view results for these tests on the individual orders.    Comprehensive Metabolic Panel [[id number]]  (Abnormal) Collected: 07/14/25 0855    Specimen: Blood Updated: 07/14/25 0925     Glucose 110 mg/dL      BUN 11.7 mg/dL      Creatinine 0.91 mg/dL      Sodium 139 mmol/L      Potassium 4.1 mmol/L      Chloride 103 mmol/L      CO2 24.3 mmol/L      Calcium 9.7 mg/dL      Total Protein 7.5 g/dL      Albumin 4.6 g/dL      ALT (SGPT) 30 U/L      AST (SGOT) 25 U/L      Alkaline Phosphatase 102 U/L      Total Bilirubin 1.2 mg/dL      Globulin 2.9 gm/dL      A/G Ratio 1.6 g/dL      BUN/Creatinine Ratio 12.9     Anion Gap 11.7 mmol/L      eGFR 97.7 mL/min/1.73     Narrative:      GFR Categories in Chronic Kidney Disease (CKD)              GFR Category          GFR (mL/min/1.73)    Interpretation  G1                    90 or greater         Normal or high (1)  G2                    60-89                Mild decrease (1)  G3a                   45-59                Mild to moderate decrease  G3b                   30-44                Moderate to severe decrease  G4                    15-29                Severe decrease  G5                    14 or less           Kidney failure    (1)In the absence of evidence of kidney disease, neither GFR category G1 or G2 fulfill the criteria for CKD.    eGFR calculation 2021 CKD-EPI creatinine equation, which does not include race as a factor    Lactic Acid, Plasma [[id number]]  (Normal) Collected: 07/14/25 0855    Specimen: Blood Updated: 07/14/25 0921     Lactate 1.2 mmol/L     CBC Auto Differential [[id number]]  (Abnormal) Collected: 07/14/25 0855    Specimen: Blood Updated: 07/14/25 0910     WBC 11.50 10*3/mm3      RBC 5.08 10*6/mm3      Hemoglobin 17.0 g/dL      Hematocrit 48.3 %      MCV 95.1 fL      MCH 33.5 pg      MCHC 35.2 g/dL      RDW 11.5 %      RDW-SD 39.8 fl      MPV 9.7 fL      Platelets 234 10*3/mm3      Neutrophil % 74.5 %      Lymphocyte % 14.8 %      Monocyte % 9.1 %      Eosinophil % 0.9 %      Basophil % 0.4 %      Immature Grans % 0.3 %      Neutrophils, Absolute 8.56 10*3/mm3      Lymphocytes, Absolute 1.70 10*3/mm3      Monocytes, Absolute 1.05 10*3/mm3      Eosinophils, Absolute 0.10 10*3/mm3      Basophils, Absolute 0.05 10*3/mm3      Immature Grans, Absolute 0.04 10*3/mm3      nRBC 0.0 /100 WBC     Ethanol [[id number]] Collected: 07/14/25 0855    Specimen: Blood Updated: 07/14/25 0933     Ethanol <10 mg/dL      Ethanol % <0.010 %     Narrative:      Not for legal purposes.    Lipase [[id number]]  (Normal) Collected: 07/14/25 0855    Specimen: Blood Updated: 07/14/25 0933     Lipase 49 U/L     Protime-INR [[id number]]  (Normal) Collected: 07/14/25 0855    Specimen: Blood Updated: 07/14/25 0921     Protime 13.5 Seconds      INR 0.99    Narrative:      Suggested Therapeutic Ranges For Oral  Anticoagulant Therapy:  Level of Therapy                      INR Target Range  Standard Dose                            2.0-3.0  High Dose                                2.5-3.5  Patients not receiving anticoagulant  Therapy Normal Range                     0.86-1.15    aPTT [[id number]]  (Abnormal) Collected: 07/14/25 0855    Specimen: Blood Updated: 07/14/25 0921     PTT 24.8 seconds     Occult Blood, Fecal By Immunoassay - Stool, Per Rectum [[id number]]  (Abnormal) Collected: 07/14/25 1002    Specimen: Stool from Per Rectum Updated: 07/14/25 1043     Occult Blood, Fecal by Immunoassay Positive    Urinalysis With Microscopic If Indicated (No Culture) - Urine, Clean Catch [[id number]]  (Abnormal) Collected: 07/14/25 1003    Specimen: Urine, Clean Catch Updated: 07/14/25 1018     Color, UA Yellow     Appearance, UA Clear     pH, UA 7.5     Specific Gravity, UA >1.030     Glucose, UA Negative     Ketones, UA Negative     Bilirubin, UA Negative     Blood, UA Negative     Protein, UA Negative     Leuk Esterase, UA Negative     Nitrite, UA Negative     Urobilinogen, UA 0.2 E.U./dL    Narrative:      Urine microscopic not indicated.             Imaging:    CT Abdomen Pelvis With Contrast  Result Date: 7/14/2025  CT ABDOMEN PELVIS W CONTRAST Date of Exam: 7/14/2025 9:17 AM EDT Indication: diffuse abdominal pain, rectal bleeding. Comparison: None available. Technique: Axial CT images were obtained of the abdomen and pelvis after the uneventful intravenous administration of iodinated contrast. Reconstructed coronal and sagittal images were also obtained. Automated exposure control and iterative construction methods were used. Findings: Lung Bases:  Mild bibasilar discoid atelectasis or scarring Liver: Mild diffuse decreased liver density compatible with fatty replacement. No focal liver lesions are seen Biliary/Gallbladder: The gallbladder is without evidence of radiopaque stones. The biliary tree is nondilated. Spleen:Spleen  is normal in size and CT density. Pancreas: Pancreas shows homogeneous density. There is no evidence of pancreatic mass or peripancreatic fluid. Kidneys: Kidneys are normal in size. There are no stones or hydronephrosis. Adrenals: Adrenal glands are unremarkable. Retroperitoneal/Lymph Nodes/Vasculature: No retroperitoneal adenopathy is identified by size criteria. Gastrointestinal/Mesentery: The small bowel loops are normal in caliber. Terminal ileum within normal limits. Appendix is normal. The large bowel is not dilated. There is a long segment of concentric wall thickening with pericolonic fat stranding along the descending colon compatible with a colitis. There is no free fluid or free air Bladder: The bladder is unremarkable Diffusely enlarged prostate results in mass effect over the bladder. Omental fat inguinal hernias Bony Structures: Multilevel disc bulges with lumbar spondylosis. Extensive endplate sclerosis at L4-5. No acute fractures. Focal nonspecific subcortical area of sclerosis at the right femoral neck could represent a sclerotic bone lesion (image 199 series  3, 103 series 4     Impression: 1.Long segment of concentric wall thickening with pericolonic fat stranding along the descending colon compatible with a colitis. Follow-up to resolution recommended 2.Enlarged prostate. Clinical and PSA correlation recommended 3.Nonspecific subcortical area of sclerosis at the right femoral neck could represent a sclerotic bone lesion. Correlate clinically and may consider further evaluation with a nonemergent MRI of the hip Electronically Signed: Sterling Brian MD  7/14/2025 9:41 AM EDT  Workstation ID: [id number]          ED course:      0950 Patient rechecked I have personally reviewed all radiology findings, incidental and otherwise, with the patient and made patient aware of what needs to be followed up with PCP.        0950 I discussed with the patient indications of admission including lab results and ED  imaging interpretation.  The patient understands and agrees with the plan of admission.  All questions and concerns regarding diagnosis or ED results are answered at this time.                  Differential Diagnosis and Discussion:  Internal hemorrhoid, diverticulitis, colitis        Consultants/Shared Management Plan:    1005 Consultant: I have discussed the case with Dr. Maradiaga GI who agrees to consult on the patient.  He request stool cultures and C. difficile panel be sent.  He declines antibiotic therapy at this time and request admission to hospitalist      1153 Hospitalist: spoke with Dr. Panda, she was ordered consultation with Dr. Maradiaga the patient's presentation, workup, findings and concerns.  She agrees to admit the patient.          Social Determinants of Health:    Patient is independent, reliable, and has access to care.       MDM     Amount and/or Complexity of Data Reviewed  Clinical lab tests: reviewed  Tests in the radiology section of CPT®: reviewed    Risk of Complications, Morbidity, and/or Mortality  Presenting problems: moderate  Diagnostic procedures: moderate  Management options: moderate           Final diagnoses:   Lower abdominal pain   Lower GI bleeding           Lisa Harvey, APRN  07/14/25 1157

## 2025-07-15 ENCOUNTER — TELEPHONE (OUTPATIENT)
Dept: GASTROENTEROLOGY | Facility: CLINIC | Age: 60
End: 2025-07-15
Payer: OTHER GOVERNMENT

## 2025-07-15 ENCOUNTER — READMISSION MANAGEMENT (OUTPATIENT)
Dept: CALL CENTER | Facility: HOSPITAL | Age: 60
End: 2025-07-15
Payer: OTHER GOVERNMENT

## 2025-07-15 VITALS
TEMPERATURE: 98.6 F | HEART RATE: 83 BPM | RESPIRATION RATE: 18 BRPM | HEIGHT: 71 IN | BODY MASS INDEX: 27.38 KG/M2 | OXYGEN SATURATION: 91 % | WEIGHT: 195.55 LBS | SYSTOLIC BLOOD PRESSURE: 117 MMHG | DIASTOLIC BLOOD PRESSURE: 78 MMHG

## 2025-07-15 LAB
ANION GAP SERPL CALCULATED.3IONS-SCNC: 8.9 MMOL/L (ref 5–15)
BASOPHILS # BLD AUTO: 0.04 10*3/MM3 (ref 0–0.2)
BASOPHILS NFR BLD AUTO: 0.4 % (ref 0–1.5)
BUN SERPL-MCNC: 14.6 MG/DL (ref 6–20)
BUN/CREAT SERPL: 15.5 (ref 7–25)
C COLI+JEJ+UPSA DNA STL QL NAA+NON-PROBE: NOT DETECTED
CALCIUM SPEC-SCNC: 9.2 MG/DL (ref 8.6–10.5)
CHLORIDE SERPL-SCNC: 104 MMOL/L (ref 98–107)
CO2 SERPL-SCNC: 25.1 MMOL/L (ref 22–29)
CREAT SERPL-MCNC: 0.94 MG/DL (ref 0.76–1.27)
DEPRECATED RDW RBC AUTO: 39.8 FL (ref 37–54)
EC STX1+STX2 GENES STL QL NAA+NON-PROBE: NOT DETECTED
EGFRCR SERPLBLD CKD-EPI 2021: 94 ML/MIN/1.73
EOSINOPHIL # BLD AUTO: 0.28 10*3/MM3 (ref 0–0.4)
EOSINOPHIL NFR BLD AUTO: 2.6 % (ref 0.3–6.2)
ERYTHROCYTE [DISTWIDTH] IN BLOOD BY AUTOMATED COUNT: 11.5 % (ref 12.3–15.4)
GLUCOSE SERPL-MCNC: 98 MG/DL (ref 65–99)
HCT VFR BLD AUTO: 45.8 % (ref 37.5–51)
HGB BLD-MCNC: 16.3 G/DL (ref 13–17.7)
IMM GRANULOCYTES # BLD AUTO: 0.06 10*3/MM3 (ref 0–0.05)
IMM GRANULOCYTES NFR BLD AUTO: 0.6 % (ref 0–0.5)
LYMPHOCYTES # BLD AUTO: 1.79 10*3/MM3 (ref 0.7–3.1)
LYMPHOCYTES NFR BLD AUTO: 16.5 % (ref 19.6–45.3)
MAGNESIUM SERPL-MCNC: 2.1 MG/DL (ref 1.6–2.6)
MCH RBC QN AUTO: 34 PG (ref 26.6–33)
MCHC RBC AUTO-ENTMCNC: 35.6 G/DL (ref 31.5–35.7)
MCV RBC AUTO: 95.4 FL (ref 79–97)
MONOCYTES # BLD AUTO: 1.02 10*3/MM3 (ref 0.1–0.9)
MONOCYTES NFR BLD AUTO: 9.4 % (ref 5–12)
NEUTROPHILS NFR BLD AUTO: 7.66 10*3/MM3 (ref 1.7–7)
NEUTROPHILS NFR BLD AUTO: 70.5 % (ref 42.7–76)
NRBC BLD AUTO-RTO: 0 /100 WBC (ref 0–0.2)
PHOSPHATE SERPL-MCNC: 3.1 MG/DL (ref 2.5–4.5)
PLATELET # BLD AUTO: 218 10*3/MM3 (ref 140–450)
PMV BLD AUTO: 9.9 FL (ref 6–12)
POTASSIUM SERPL-SCNC: 4.2 MMOL/L (ref 3.5–5.2)
RBC # BLD AUTO: 4.8 10*6/MM3 (ref 4.14–5.8)
S ENT+BONG DNA STL QL NAA+NON-PROBE: NOT DETECTED
SHIGELLA SP+EIEC IPAH ST NAA+NON-PROBE: NOT DETECTED
SODIUM SERPL-SCNC: 138 MMOL/L (ref 136–145)
WBC NRBC COR # BLD AUTO: 10.85 10*3/MM3 (ref 3.4–10.8)

## 2025-07-15 PROCEDURE — 25010000002 CEFTRIAXONE PER 250 MG: Performed by: INTERNAL MEDICINE

## 2025-07-15 PROCEDURE — 84100 ASSAY OF PHOSPHORUS: CPT | Performed by: STUDENT IN AN ORGANIZED HEALTH CARE EDUCATION/TRAINING PROGRAM

## 2025-07-15 PROCEDURE — 36415 COLL VENOUS BLD VENIPUNCTURE: CPT | Performed by: STUDENT IN AN ORGANIZED HEALTH CARE EDUCATION/TRAINING PROGRAM

## 2025-07-15 PROCEDURE — 83735 ASSAY OF MAGNESIUM: CPT | Performed by: STUDENT IN AN ORGANIZED HEALTH CARE EDUCATION/TRAINING PROGRAM

## 2025-07-15 PROCEDURE — 96365 THER/PROPH/DIAG IV INF INIT: CPT

## 2025-07-15 PROCEDURE — 99239 HOSP IP/OBS DSCHRG MGMT >30: CPT | Performed by: INTERNAL MEDICINE

## 2025-07-15 PROCEDURE — 84153 ASSAY OF PSA TOTAL: CPT | Performed by: STUDENT IN AN ORGANIZED HEALTH CARE EDUCATION/TRAINING PROGRAM

## 2025-07-15 PROCEDURE — 85025 COMPLETE CBC W/AUTO DIFF WBC: CPT | Performed by: STUDENT IN AN ORGANIZED HEALTH CARE EDUCATION/TRAINING PROGRAM

## 2025-07-15 PROCEDURE — G0378 HOSPITAL OBSERVATION PER HR: HCPCS

## 2025-07-15 PROCEDURE — 80048 BASIC METABOLIC PNL TOTAL CA: CPT | Performed by: STUDENT IN AN ORGANIZED HEALTH CARE EDUCATION/TRAINING PROGRAM

## 2025-07-15 PROCEDURE — 84154 ASSAY OF PSA FREE: CPT | Performed by: STUDENT IN AN ORGANIZED HEALTH CARE EDUCATION/TRAINING PROGRAM

## 2025-07-15 RX ORDER — LIDOCAINE 4 G/G
1 PATCH TOPICAL DAILY PRN
Status: DISCONTINUED | OUTPATIENT
Start: 2025-07-15 | End: 2025-07-15 | Stop reason: HOSPADM

## 2025-07-15 RX ORDER — METRONIDAZOLE 500 MG/1
500 TABLET ORAL EVERY 8 HOURS SCHEDULED
Status: DISCONTINUED | OUTPATIENT
Start: 2025-07-15 | End: 2025-07-15 | Stop reason: HOSPADM

## 2025-07-15 RX ORDER — ALUMINA, MAGNESIA, AND SIMETHICONE 2400; 2400; 240 MG/30ML; MG/30ML; MG/30ML
15 SUSPENSION ORAL EVERY 6 HOURS PRN
Status: DISCONTINUED | OUTPATIENT
Start: 2025-07-15 | End: 2025-07-15 | Stop reason: HOSPADM

## 2025-07-15 RX ORDER — ACETAMINOPHEN 325 MG/1
650 TABLET ORAL EVERY 6 HOURS PRN
Status: DISCONTINUED | OUTPATIENT
Start: 2025-07-15 | End: 2025-07-15 | Stop reason: HOSPADM

## 2025-07-15 RX ORDER — AMOXICILLIN AND CLAVULANATE POTASSIUM 562.5; 437.5; 62.5 MG/1; MG/1; MG/1
2 TABLET, MULTILAYER, EXTENDED RELEASE ORAL 2 TIMES DAILY
Qty: 20 TABLET | Refills: 0 | Status: SHIPPED | OUTPATIENT
Start: 2025-07-16 | End: 2025-07-15 | Stop reason: HOSPADM

## 2025-07-15 RX ORDER — NICOTINE 21 MG/24HR
1 PATCH, TRANSDERMAL 24 HOURS TRANSDERMAL DAILY PRN
Status: DISCONTINUED | OUTPATIENT
Start: 2025-07-15 | End: 2025-07-15 | Stop reason: HOSPADM

## 2025-07-15 RX ORDER — HYDROXYZINE HYDROCHLORIDE 25 MG/1
25 TABLET, FILM COATED ORAL 3 TIMES DAILY PRN
Status: DISCONTINUED | OUTPATIENT
Start: 2025-07-15 | End: 2025-07-15 | Stop reason: HOSPADM

## 2025-07-15 RX ORDER — ONDANSETRON 2 MG/ML
4 INJECTION INTRAMUSCULAR; INTRAVENOUS EVERY 4 HOURS PRN
Status: DISCONTINUED | OUTPATIENT
Start: 2025-07-15 | End: 2025-07-15 | Stop reason: HOSPADM

## 2025-07-15 RX ORDER — LIDOCAINE 5 G/100G
1 CREAM RECTAL; TOPICAL 3 TIMES DAILY PRN
Status: DISCONTINUED | OUTPATIENT
Start: 2025-07-15 | End: 2025-07-15 | Stop reason: HOSPADM

## 2025-07-15 RX ADMIN — Medication 10 ML: at 10:42

## 2025-07-15 RX ADMIN — CEFTRIAXONE SODIUM 2000 MG: 2 INJECTION, POWDER, FOR SOLUTION INTRAMUSCULAR; INTRAVENOUS at 10:37

## 2025-07-15 RX ADMIN — METRONIDAZOLE 500 MG: 500 TABLET ORAL at 13:22

## 2025-07-15 NOTE — DISCHARGE SUMMARY
[facility]         HOSPITALIST  DISCHARGE SUMMARY    Patient Name: Mehrdad Diamond    : 1965    MRN: [id number]    Date of Admission: 2025  Date of Discharge:  07/15/25  Primary Care Physician: Ashtyn Coyle APRN    Consults       Date and Time Order Name Status Description    2025  9:57 AM IP Consult to Gastroenterology      2025  9:57 AM Inpatient Hospitalist Consult              Final Diagnosis:  Bright red bleeding per rectum, possibly secondary to acute colitis  Infectious colitis, suspect bacterial organism  Nausea vomiting/abdominal pain secondary to above  Nonspecific subcortical area of sclerosis of right femoral neck could represent a sclerotic bone lesion - outpatient follow up     Hospital Course     HPI:     Mehrdad Diamond is a 59 y.o. male with history of mixed hyperlipidemia and family history of CAD who presented with bright red blood per rectum.  Patient stated symptoms started day night.  He takes aspirin at home.  He noticed some blood initially started in the night but after that started having [place] blood.  No history of hemorrhoids and painless BRBPR.  He also started having abdominal pain along with [place] blood, denied any diarrhea.  Felt like he was having a bowel movement but stated no stool came out, it was only [place] blood.  It was happening often and also had 1 episode of vomiting yesterday along with abdominal pain after which she got concerned and presented to ER for further evaluation management.  Takes aspirin daily but did not take his last night dose, does take ibuprofen occasionally for headache.  He had last colonoscopy done in 2017 which showed 1 benign polyp.  On presentation to ER, vitals were stable.  Lab work showed WBC of 11.50 and hemoglobin of 17.0.  FOBT was positive.  PT 13.5 and INR 0.99.  Lactic acid of 1.2 and lipase was normal.  CT abdomen pelvis with contrast showed long segment of concentric wall thickening  with pericolonic fat stranding along the descending colon compatible with colitis and a large prostate.  Also showed nonspecific subcortical area of sclerosis of right femoral neck which could represent sclerotic bone lesion.  GI was consulted by ER physician and patient was admitted under observation for further evaluation management.       Hospital Course:  Patient monitored overnight, symptoms had essentially resolved did have a tiny amount of blood in his stool, stool was brown, no further abdominal pain, nausea or vomiting.  Hemoglobin still within normal limits.  GI evaluated, patient will hold his aspirin and plan for outpatient colonoscopy.  Give dose IV antibiotics and transitioning to Augmentin for suspected infectious colitis from undetermined bacterial organism for 5 additional days.  C. difficile and enteric panel was otherwise negative for specific organism.    Patient had incidental right femoral neck lesion seen on initial imaging, discussed result with patient, outpatient MRI of the hip ordered to further evaluate and he will follow-up with his PCP for further monitoring for this.  PSA level is pending at time of discharge.  Patient did have some incidental enlarged prostate noted creatinine was within normal limits, monitor outpatient may ultimately need Flomax in the future.    Patient discharged in stable condition with close outpatient follow up. Return precautions and follow up discussed and patient and wife voiced agreement and understanding of treatment plan.     DISCHARGE Follow Up Recommendations for labs and diagnostics:   PSA level pending from admission  As above    CODE STATUS:  Code Status and Medical Interventions: CPR (Attempt to Resuscitate); Full Support   Ordered at: 07/14/25 1805     Code Status (Patient has no pulse and is not breathing):    CPR (Attempt to Resuscitate)     Medical Interventions (Patient has pulse or is breathing):    Full Support           Day of Discharge      Vital Signs:  Temp:  [97.3 °F (36.3 °C)-98.6 °F (37 °C)] 98.6 °F (37 °C)  Heart Rate:  [69-83] 83  Resp:  [16-18] 18  BP: (107-135)/(62-96) 117/78    Physical Exam    Gen: awake, resting in bed, conversant  Resp: breathing comfortably on room air  Rrr  Abdomen soft nontender no guarding positive bowel sounds  Ambulating without acute hip pain    Discharge Details        Discharge Medications        PAUSE taking these medications        Instructions Start Date   aspirin 81 MG EC tablet  Wait to take this until your doctor or other care provider tells you to start again.   Take 1 tablet by mouth Daily.             New Medications        Instructions Start Date   amoxicillin-clavulanate XR 1000-62.5 MG per 12 hr tablet  Commonly known as: AUGMENTIN XR   2 tablets, Oral, 2 Times Daily   Start Date: July 16, 2025            Continue These Medications        Instructions Start Date   atorvastatin 40 MG tablet  Commonly known as: LIPITOR   Take 1 tablet by mouth Every Night.      Evolocumab solution prefilled syringe injection  Commonly known as: REPATHA   140 mg, Every 14 Days                 Discharge Disposition:  Home or Self Care    Diet: continue on diet / dietary restrictions from hospitalization   Dietary Orders (From admission, onward)       Start     Ordered    07/14/25 1438  Diet: Cardiac, Gastrointestinal; Healthy Heart (2-3 Na+); Low Irritant; Fluid Consistency: Thin (IDDSI 0)  Diet Effective Now        References:    Diet Order Definitions   Question Answer Comment   Diets: Cardiac    Diets: Gastrointestinal    Cardiac Diet: Healthy Heart (2-3 Na+)    Gastrointestinal Diet: Low Irritant    Fluid Consistency: Thin (IDDSI 0)        07/14/25 1438                    Discharge Activity: advance as tolerated      Additional Instructions for the Follow-ups that You Need to Schedule       Discharge Follow-up with PCP   As directed       Currently Documented PCP:    Ashtyn Coyel APRN    PCP Phone  Number:    [phone]     Follow Up Details: 5 days hospital f/u        MRI hip right w wo contrast   Jul 16, 2025      Exam reason: better evaluate right femoral neck sclerotic lesion seen on ct   Release to patient: Routine Release                Pertinent  and/or Most Recent Results       LAB RESULTS:      Lab 07/15/25  0431 07/14/25  0855   WBC 10.85* 11.50*   HEMOGLOBIN 16.3 17.0   HEMATOCRIT 45.8 48.3   PLATELETS 218 234   NEUTROS ABS 7.66* 8.56*   IMMATURE GRANS (ABS) 0.06* 0.04   LYMPHS ABS 1.79 1.70   MONOS ABS 1.02* 1.05*   EOS ABS 0.28 0.10   MCV 95.4 95.1   LACTATE  --  1.2   PROTIME  --  13.5   APTT  --  24.8*         Lab 07/15/25  0431 07/14/25  0855   SODIUM 138 139   POTASSIUM 4.2 4.1   CHLORIDE 104 103   CO2 25.1 24.3   ANION GAP 8.9 11.7   BUN 14.6 11.7   CREATININE 0.94 0.91   EGFR 94.0 97.7   GLUCOSE 98 110*   CALCIUM 9.2 9.7   MAGNESIUM 2.1  --    PHOSPHORUS 3.1  --          Lab 07/14/25  0855   TOTAL PROTEIN 7.5   ALBUMIN 4.6   GLOBULIN 2.9   ALT (SGPT) 30   AST (SGOT) 25   BILIRUBIN 1.2   ALK PHOS 102   LIPASE 49         Lab 07/14/25  0855   PROTIME 13.5   INR 0.99             Lab 07/14/25  0937 07/14/25  0855   ABO TYPING B B   RH TYPING Positive Positive   ANTIBODY SCREEN  --  Negative         Brief Urine Lab Results  (Last result in the past 365 days)        Color   Clarity   Blood   Leuk Est   Nitrite   Protein   CREAT   Urine HCG        07/14/25 1003 Yellow   Clear   Negative   Negative   Negative   Negative                 Microbiology Results (last 10 days)       Procedure Component Value - Date/Time    Enteric Bacterial Panel - Stool, Per Rectum [[id number]]  (Normal) Collected: 07/14/25 1510    Lab Status: Final result Specimen: Stool from Per Rectum Updated: 07/15/25 1222     Salmonella Not Detected     Campylobacter Not Detected     Shigella/Enteroinvasive E. coli (EIEC) Not Detected     Shiga-like toxin-producing E. coli (STEC) stx1/stx2 Not Detected    Clostridioides difficile  Toxin - Stool, Per Rectum [[id number]] Collected: 07/14/25 1510    Lab Status: Final result Specimen: Stool from Per Rectum Updated: 07/14/25 1601    Narrative:      The following orders were created for panel order Clostridioides difficile Toxin - Stool, Per Rectum.  Procedure                               Abnormality         Status                     ---------                               -----------         ------                     Clostridioides difficile...[[id number]]                      Final result                 Please view results for these tests on the individual orders.    Clostridioides difficile Toxin, PCR - Stool, Per Rectum [[id number]] Collected: 07/14/25 1510    Lab Status: Final result Specimen: Stool from Per Rectum Updated: 07/14/25 1601     Toxigenic C. difficile by PCR Negative     027 Toxin Presumptive Negative    Narrative:      The result indicates the absence of toxigenic C. difficile from stool specimen.             RADIOLOGY:    CT Abdomen Pelvis With Contrast  Result Date: 7/14/2025  Impression: Impression: 1.Long segment of concentric wall thickening with pericolonic fat stranding along the descending colon compatible with a colitis. Follow-up to resolution recommended 2.Enlarged prostate. Clinical and PSA correlation recommended 3.Nonspecific subcortical area of sclerosis at the right femoral neck could represent a sclerotic bone lesion. Correlate clinically and may consider further evaluation with a nonemergent MRI of the hip Electronically Signed: Sterling Brian MD  7/14/2025 9:41 AM EDT  Workstation ID: [id number]                  Labs Pending at Discharge:  Pending Labs       Order Current Status    PSA Total, Reflex To Free In process              Time spent on Discharge including face to face service:  33 minutes

## 2025-07-15 NOTE — PLAN OF CARE
Goal Outcome Evaluation:  Plan of Care Reviewed With: patient, spouse        Progress: improving  Outcome Evaluation: denies pain/discomfort needing intervention. up ad donna in room, encouraged to ambulate. discussed food choices that may have caused colitis symptoms. plan discharge today home. no new issues/neeeds noted at this time.

## 2025-07-15 NOTE — OUTREACH NOTE
Prep Survey      Flowsheet Row Responses   [facility] facility patient discharged from? Early   Is LACE score < 7 ? Yes   Eligibility Not Eligible   What are the reasons patient is not eligible? Other  [low risk for readmit]   Does the patient have one of the following disease processes/diagnoses(primary or secondary)? Other   Prep survey completed? Yes            KANDY MONREAL - Registered Nurse

## 2025-07-15 NOTE — TELEPHONE ENCOUNTER
This patient is being discharged, was admitted to the hospital yesterday 07/14/2025 for abdominal pain. This patient is established with [facility] Last seen by gastro 01/20/2021. Please advise on a follow up needing for this patient.

## 2025-07-16 LAB
PSA FREE MFR SERPL: 17.6 %
PSA FREE SERPL-MCNC: 0.88 NG/ML
PSA SERPL-MCNC: 5 NG/ML (ref 0–4)
REFLEX: ABNORMAL

## 2025-07-18 DIAGNOSIS — K52.9 COLITIS: Primary | ICD-10-CM

## 2025-07-18 NOTE — TELEPHONE ENCOUNTER
S/w pt scheduled colonoscopy for 9/9/2025.     Verbally reviewed instructions     Will also send to [facility] [employer] [facility]     Pt denies being currently followed by cardiology, pulmonology, being on a BT and or taking a GLP1       [place] which prep should pt do, Please advise there was not a prep sent in when orders were placed. Pt is aware we will send instructions to [facility] once prep is sent. Please let me know once prep is sent.

## 2025-07-21 DIAGNOSIS — K52.9 COLITIS: Primary | ICD-10-CM

## 2025-07-21 RX ORDER — SODIUM, POTASSIUM,MAG SULFATES 17.5-3.13G
2 SOLUTION, RECONSTITUTED, ORAL ORAL TAKE AS DIRECTED
Qty: 177 ML | Refills: 0 | Status: SHIPPED | OUTPATIENT
Start: 2025-07-21

## 2025-07-22 ENCOUNTER — TRANSCRIBE ORDERS (OUTPATIENT)
Dept: ADMINISTRATIVE | Facility: HOSPITAL | Age: 60
End: 2025-07-22
Payer: OTHER GOVERNMENT

## 2025-07-22 DIAGNOSIS — M89.8X5 OTHER SPECIFIED DISORDERS OF BONE, THIGH: Primary | ICD-10-CM

## 2025-07-23 ENCOUNTER — HOSPITAL ENCOUNTER (OUTPATIENT)
Dept: MRI IMAGING | Facility: HOSPITAL | Age: 60
Discharge: HOME | End: 2025-07-23
Admitting: NURSE PRACTITIONER
Payer: OTHER GOVERNMENT

## 2025-07-23 DIAGNOSIS — M89.8X5 OTHER SPECIFIED DISORDERS OF BONE, THIGH: ICD-10-CM

## 2025-07-23 PROCEDURE — 25510000001 GADOPICLENOL 0.5 MMOL/ML SOLUTION: Performed by: NURSE PRACTITIONER

## 2025-07-23 PROCEDURE — 73723 MRI JOINT LWR EXTR W/O&W/DYE: CPT

## 2025-07-23 PROCEDURE — A9573 GADOPICLENOL 0.5 MMOL/ML SOLUTION: HCPCS | Performed by: NURSE PRACTITIONER

## 2025-07-23 RX ADMIN — GADOPICLENOL 10 ML: 485.1 INJECTION INTRAVENOUS at 08:13

## 2025-08-04 ENCOUNTER — LAB (OUTPATIENT)
Facility: HOSPITAL | Age: 60
End: 2025-08-04
Payer: OTHER GOVERNMENT

## 2025-08-04 ENCOUNTER — OFFICE VISIT (OUTPATIENT)
Dept: UROLOGY | Age: 60
End: 2025-08-04
Payer: OTHER GOVERNMENT

## 2025-08-04 VITALS — BODY MASS INDEX: 27.72 KG/M2 | RESPIRATION RATE: 20 BRPM | WEIGHT: 198 LBS | HEIGHT: 71 IN

## 2025-08-04 DIAGNOSIS — R97.20 ELEVATED PROSTATE SPECIFIC ANTIGEN (PSA): Primary | ICD-10-CM

## 2025-08-04 DIAGNOSIS — N40.1 BENIGN PROSTATIC HYPERPLASIA WITH WEAK URINARY STREAM: ICD-10-CM

## 2025-08-04 DIAGNOSIS — R39.12 BENIGN PROSTATIC HYPERPLASIA WITH WEAK URINARY STREAM: ICD-10-CM

## 2025-08-04 DIAGNOSIS — R97.20 ELEVATED PROSTATE SPECIFIC ANTIGEN (PSA): ICD-10-CM

## 2025-08-04 LAB
PSA SERPL-MCNC: 6.91 NG/ML (ref 0–4)
SPECIMEN VOL SMN: NORMAL ML

## 2025-08-04 PROCEDURE — 99213 OFFICE O/P EST LOW 20 MIN: CPT | Performed by: NURSE PRACTITIONER

## 2025-08-04 PROCEDURE — 36415 COLL VENOUS BLD VENIPUNCTURE: CPT

## 2025-08-04 PROCEDURE — 84153 ASSAY OF PSA TOTAL: CPT

## 2025-08-05 ENCOUNTER — RESULTS FOLLOW-UP (OUTPATIENT)
Dept: UROLOGY | Age: 60
End: 2025-08-05
Payer: OTHER GOVERNMENT

## 2025-08-05 DIAGNOSIS — R97.20 ELEVATED PROSTATE SPECIFIC ANTIGEN (PSA): Primary | ICD-10-CM

## 2025-08-18 ENCOUNTER — TELEPHONE (OUTPATIENT)
Dept: GASTROENTEROLOGY | Facility: CLINIC | Age: 60
End: 2025-08-18
Payer: OTHER GOVERNMENT

## 2025-08-22 ENCOUNTER — TELEPHONE (OUTPATIENT)
Dept: GASTROENTEROLOGY | Facility: CLINIC | Age: 60
End: 2025-08-22
Payer: OTHER GOVERNMENT

## 2025-08-27 ENCOUNTER — TELEPHONE (OUTPATIENT)
Dept: UROLOGY | Age: 60
End: 2025-08-27
Payer: OTHER GOVERNMENT

## 2025-08-28 ENCOUNTER — TELEPHONE (OUTPATIENT)
Dept: UROLOGY | Age: 60
End: 2025-08-28
Payer: OTHER GOVERNMENT